# Patient Record
(demographics unavailable — no encounter records)

---

## 2024-11-19 NOTE — ADDENDUM
[FreeTextEntry1] : Entered by Dylan Bolton, acting as scribe for Dr. Alan Springer. The documentation recorded by the scribe accurately reflects the service I personally performed and the decisions made by me.

## 2024-11-19 NOTE — LETTER BODY
[FreeTextEntry1] : Jaison Zepeda -[address], [place] [address] [phone]  Dear Dr. Zepeda,  Reason for Visit: BPH. Nocturia. Erectile dysfunction.  This is a 55-year-old gentleman with symptoms of BPH, nocturia, and erectile dysfunction. Patient is here today for follow-up. Since he was last seen, patient reports taking Flomax. Patient reports taking the medications regularly without any side effects or difficulties with the medication. He reports strong uroflow with medical therapy. However, he experiences persistent nocturia.  He denies any hematuria or urinary incontinence. His symptoms are aggravated by hydration. He denies any alleviating factors. Patient also reports of erectile dysfunction. Patient reports normal libido and sex drive. However, he notes decreased penile tumescence.  He reports no pain. All other review of systems are negative. He has no cancer in his family medical history. He has no previous surgical history. Past medical history, family history and social history were inquired and were noncontributory to current condition. The patient does not use tobacco or drink alcohol. Medications and allergies were reviewed. He has no known allergies to medication.  On examination, the patient is a healthy-appearing gentleman in no acute distress. He is alert and oriented follows commands. He has normal mood and affect. He is normocephalic. Neck is supple. Oral no thrush Respirations are unlabored. His abdomen is soft and nontender. Bladder is nonpalpable. No CVA tenderness. Neurologically he is grossly intact. No peripheral edema. Skin without gross abnormality.    His BMP demonstrated normal renal function, creatinine 0.86. His PSA was 0.25.    Post-void residual on bladder scan today was 0 cc.   ASSESSMENT: BPH. Nocturia. Erectile dysfunction.   I counseled the patient. In terms of his BPH, patient reports good urinary flow on Flomax. I recommended he continues Flomax. In terms of his nocturia, patient experiences persistent symptoms despite medical therapy. His PVR today was 0 cc. Given his moderate symptoms, I recommended the patient begin a trial of Oxybutynin 5 mg. I discussed the potential side effects of the medication. I counseled the patient on its use and side effects. If the patient develops any side effects, the patient will discontinue medication and contact me. I renewed the patient's prescription for Flomax and Oxybutynin today. I encouraged the patient to continue medications regularly as directed. His PSA was 0.25, WNL. He will repeat PSA and BMP to establish baseline. Risks and alternatives were discussed. I answered the patient questions. The patient will follow-up as directed and will contact me with any questions or concerns. Thank you for the opportunity to participate in the care of Mr. BARROS. I will keep you updated on his progress.   Plan: Trial of Oxybutynin. Continue Flomax. PSA. BMP. PVR. Follow up in 1 month.

## 2024-11-19 NOTE — HISTORY OF PRESENT ILLNESS
[FreeTextEntry1] : Follow-up for BPH, on Flomax once daily, reports nocturia 2-3 times/night, improved but not reach his goal. PVR 0ml today PSA 0.25 in Oct 2023  Please refer to [facility]RO Consult Note.